# Patient Record
Sex: FEMALE | Race: OTHER | Employment: OTHER | URBAN - METROPOLITAN AREA
[De-identification: names, ages, dates, MRNs, and addresses within clinical notes are randomized per-mention and may not be internally consistent; named-entity substitution may affect disease eponyms.]

---

## 2019-01-15 NOTE — PERIOP NOTES
PAT - SURGICAL PRE-ADMISSION INSTRUCTIONS 
 
NAME:  Chaitanya Gamez                                                          TODAY'S DATE:  1/15/2019 SURGERY DATE:  1/17/2019                                  SURGERY ARRIVAL TIME:   TBA 1. Do NOT eat or drink anything, including candy or gum, after MIDNIGHT on 1/16/19 , unless you have specific instructions from your Surgeon or Anesthesia Provider to do so. 2. No smoking on the day of surgery. 3. No alcohol 24 hours prior to the day of surgery. 4. No recreational drugs for one week prior to the day of surgery. 5. Leave all valuables, including money/purse, at home. 6. Remove all jewelry, nail polish, makeup (including mascara); no lotions, powders, deodorant, or perfume/cologne/after shave. 7. Glasses/Contact lenses and Dentures may be worn to the hospital.  They will be removed prior to surgery. 8. Call your doctor if symptoms of a cold or illness develop within 24 ours prior to surgery. 9. AN ADULT MUST DRIVE YOU HOME AFTER OUTPATIENT SURGERY. 10. If you are having an OUTPATIENT procedure, please make arrangements for a responsible adult to be with you for 24 hours after your surgery. 11. If you are admitted to the hospital, you will be assigned to a bed after surgery is complete. Normally a family member will not be able to see you until you are in your assigned bed. 15. Family is encouraged to accompany you to the hospital.  We ask visitors in the treatment area to be limited to ONE person at a time to ensure patient privacy. EXCEPTIONS WILL BE MADE AS NEEDED. 15. Children under 12 are discouraged from entering the treatment area and need to be supervised by an adult when in the waiting room. Special Instructions: 
 
NONE. Patient Prep: 
 
shower with anti-bacterial soap These surgical instructions were reviewed with PATIENT during the PAT PHONE CALL. Directions:   On the morning of surgery, please go to the [facility]. Enter the building from the [facility] entrance, 1st floor (next to the Emergency Room entrance). Take the elevator to the 2nd floor. Sign in at the Registration Desk. If you have any questions and/or concerns, please do not hesitate to call: 
(Prior to the day of surgery)  [facility] unit:  [phone] (Day of surgery)  [facility] unit:  [phone]

## 2019-01-16 ENCOUNTER — ANESTHESIA EVENT (OUTPATIENT)
Dept: SURGERY | Age: 59
End: 2019-01-16
Payer: COMMERCIAL

## 2019-01-17 ENCOUNTER — HOSPITAL ENCOUNTER (OUTPATIENT)
Age: 59
Setting detail: OUTPATIENT SURGERY
Discharge: HOME | End: 2019-01-17
Attending: PLASTIC SURGERY | Admitting: PLASTIC SURGERY
Payer: COMMERCIAL

## 2019-01-17 ENCOUNTER — ANESTHESIA (OUTPATIENT)
Dept: SURGERY | Age: 59
End: 2019-01-17
Payer: COMMERCIAL

## 2019-01-17 VITALS
RESPIRATION RATE: 13 BRPM | WEIGHT: 206.5 LBS | HEIGHT: 66 IN | BODY MASS INDEX: 33.19 KG/M2 | DIASTOLIC BLOOD PRESSURE: 75 MMHG | TEMPERATURE: 98.4 F | OXYGEN SATURATION: 100 % | SYSTOLIC BLOOD PRESSURE: 106 MMHG | HEART RATE: 74 BPM

## 2019-01-17 DIAGNOSIS — S01.80XA OPEN FOREHEAD WOUND, INITIAL ENCOUNTER: Primary | ICD-10-CM

## 2019-01-17 PROCEDURE — 74011250636 HC RX REV CODE- 250/636

## 2019-01-17 PROCEDURE — 74011250636 HC RX REV CODE- 250/636: Performed by: PLASTIC SURGERY

## 2019-01-17 PROCEDURE — 74011250636 HC RX REV CODE- 250/636: Performed by: NURSE ANESTHETIST, CERTIFIED REGISTERED

## 2019-01-17 PROCEDURE — 74011000250 HC RX REV CODE- 250: Performed by: PLASTIC SURGERY

## 2019-01-17 PROCEDURE — 76010000149 HC OR TIME 1 TO 1.5 HR: Performed by: PLASTIC SURGERY

## 2019-01-17 PROCEDURE — 74011000250 HC RX REV CODE- 250

## 2019-01-17 PROCEDURE — 74011250637 HC RX REV CODE- 250/637: Performed by: NURSE ANESTHETIST, CERTIFIED REGISTERED

## 2019-01-17 PROCEDURE — 77030018846 HC SOL IRR STRL H20 ICUM -A: Performed by: PLASTIC SURGERY

## 2019-01-17 PROCEDURE — 77030018836 HC SOL IRR NACL ICUM -A: Performed by: PLASTIC SURGERY

## 2019-01-17 PROCEDURE — 77030011265 HC ELECTRD BLD HEX COVD -A: Performed by: PLASTIC SURGERY

## 2019-01-17 PROCEDURE — 76210000021 HC REC RM PH II 0.5 TO 1 HR: Performed by: PLASTIC SURGERY

## 2019-01-17 PROCEDURE — 77030002974 HC SUT PLN J&J -A: Performed by: PLASTIC SURGERY

## 2019-01-17 PROCEDURE — 77030013629 HC ELECTRD NDL STRY -B: Performed by: PLASTIC SURGERY

## 2019-01-17 PROCEDURE — 76060000033 HC ANESTHESIA 1 TO 1.5 HR: Performed by: PLASTIC SURGERY

## 2019-01-17 RX ORDER — SODIUM CHLORIDE, SODIUM LACTATE, POTASSIUM CHLORIDE, CALCIUM CHLORIDE 600; 310; 30; 20 MG/100ML; MG/100ML; MG/100ML; MG/100ML
50 INJECTION, SOLUTION INTRAVENOUS CONTINUOUS
Status: DISCONTINUED | OUTPATIENT
Start: 2019-01-17 | End: 2019-01-17 | Stop reason: HOSPADM

## 2019-01-17 RX ORDER — MIDAZOLAM HYDROCHLORIDE 1 MG/ML
INJECTION, SOLUTION INTRAMUSCULAR; INTRAVENOUS AS NEEDED
Status: DISCONTINUED | OUTPATIENT
Start: 2019-01-17 | End: 2019-01-17 | Stop reason: HOSPADM

## 2019-01-17 RX ORDER — DIPHENHYDRAMINE HYDROCHLORIDE 50 MG/ML
25 INJECTION, SOLUTION INTRAMUSCULAR; INTRAVENOUS
Status: CANCELLED | OUTPATIENT
Start: 2019-01-17

## 2019-01-17 RX ORDER — FENTANYL CITRATE 50 UG/ML
INJECTION, SOLUTION INTRAMUSCULAR; INTRAVENOUS AS NEEDED
Status: DISCONTINUED | OUTPATIENT
Start: 2019-01-17 | End: 2019-01-17 | Stop reason: HOSPADM

## 2019-01-17 RX ORDER — PROPOFOL 10 MG/ML
INJECTION, EMULSION INTRAVENOUS
Status: DISCONTINUED | OUTPATIENT
Start: 2019-01-17 | End: 2019-01-17 | Stop reason: HOSPADM

## 2019-01-17 RX ORDER — SODIUM CHLORIDE 0.9 % (FLUSH) 0.9 %
5-40 SYRINGE (ML) INJECTION EVERY 8 HOURS
Status: DISCONTINUED | OUTPATIENT
Start: 2019-01-17 | End: 2019-01-17 | Stop reason: HOSPADM

## 2019-01-17 RX ORDER — CLINDAMYCIN PHOSPHATE 900 MG/50ML
900 INJECTION INTRAVENOUS ONCE
Status: COMPLETED | OUTPATIENT
Start: 2019-01-17 | End: 2019-01-17

## 2019-01-17 RX ORDER — ONDANSETRON 2 MG/ML
INJECTION INTRAMUSCULAR; INTRAVENOUS AS NEEDED
Status: DISCONTINUED | OUTPATIENT
Start: 2019-01-17 | End: 2019-01-17 | Stop reason: HOSPADM

## 2019-01-17 RX ORDER — SCOLOPAMINE TRANSDERMAL SYSTEM 1 MG/1
1 PATCH, EXTENDED RELEASE TRANSDERMAL ONCE
Status: DISCONTINUED | OUTPATIENT
Start: 2019-01-17 | End: 2019-01-17 | Stop reason: HOSPADM

## 2019-01-17 RX ORDER — HYDROCODONE BITARTRATE AND ACETAMINOPHEN 5; 325 MG/1; MG/1
1 TABLET ORAL
Qty: 5 TAB | Refills: 0 | Status: SHIPPED | OUTPATIENT
Start: 2019-01-17

## 2019-01-17 RX ORDER — FAMOTIDINE 20 MG/1
20 TABLET, FILM COATED ORAL ONCE
Status: DISCONTINUED | OUTPATIENT
Start: 2019-01-17 | End: 2019-01-17 | Stop reason: HOSPADM

## 2019-01-17 RX ORDER — SODIUM CHLORIDE, SODIUM LACTATE, POTASSIUM CHLORIDE, CALCIUM CHLORIDE 600; 310; 30; 20 MG/100ML; MG/100ML; MG/100ML; MG/100ML
75 INJECTION, SOLUTION INTRAVENOUS CONTINUOUS
Status: CANCELLED | OUTPATIENT
Start: 2019-01-17

## 2019-01-17 RX ORDER — EPHEDRINE SULFATE 50 MG/ML
INJECTION, SOLUTION INTRAVENOUS AS NEEDED
Status: DISCONTINUED | OUTPATIENT
Start: 2019-01-17 | End: 2019-01-17 | Stop reason: HOSPADM

## 2019-01-17 RX ORDER — BISMUTH SUBSALICYLATE 262 MG
1 TABLET,CHEWABLE ORAL DAILY
COMMUNITY

## 2019-01-17 RX ORDER — FENTANYL CITRATE 50 UG/ML
50 INJECTION, SOLUTION INTRAMUSCULAR; INTRAVENOUS AS NEEDED
Status: CANCELLED | OUTPATIENT
Start: 2019-01-17

## 2019-01-17 RX ORDER — HYDROCODONE BITARTRATE AND ACETAMINOPHEN 5; 325 MG/1; MG/1
1 TABLET ORAL ONCE
Status: CANCELLED | OUTPATIENT
Start: 2019-01-17 | End: 2019-01-17

## 2019-01-17 RX ORDER — SODIUM CHLORIDE 0.9 % (FLUSH) 0.9 %
5-40 SYRINGE (ML) INJECTION AS NEEDED
Status: DISCONTINUED | OUTPATIENT
Start: 2019-01-17 | End: 2019-01-17 | Stop reason: HOSPADM

## 2019-01-17 RX ORDER — LIDOCAINE HYDROCHLORIDE 20 MG/ML
INJECTION, SOLUTION EPIDURAL; INFILTRATION; INTRACAUDAL; PERINEURAL AS NEEDED
Status: DISCONTINUED | OUTPATIENT
Start: 2019-01-17 | End: 2019-01-17 | Stop reason: HOSPADM

## 2019-01-17 RX ADMIN — SODIUM CHLORIDE, SODIUM LACTATE, POTASSIUM CHLORIDE, AND CALCIUM CHLORIDE 50 ML/HR: 600; 310; 30; 20 INJECTION, SOLUTION INTRAVENOUS at 10:57

## 2019-01-17 RX ADMIN — LIDOCAINE HYDROCHLORIDE 100 MG: 20 INJECTION, SOLUTION EPIDURAL; INFILTRATION; INTRACAUDAL; PERINEURAL at 12:05

## 2019-01-17 RX ADMIN — ONDANSETRON 4 MG: 2 INJECTION INTRAMUSCULAR; INTRAVENOUS at 12:10

## 2019-01-17 RX ADMIN — MIDAZOLAM HYDROCHLORIDE 2 MG: 1 INJECTION, SOLUTION INTRAMUSCULAR; INTRAVENOUS at 11:56

## 2019-01-17 RX ADMIN — EPHEDRINE SULFATE 5 MG: 50 INJECTION, SOLUTION INTRAVENOUS at 12:38

## 2019-01-17 RX ADMIN — PROPOFOL 160 MCG/KG/MIN: 10 INJECTION, EMULSION INTRAVENOUS at 12:05

## 2019-01-17 RX ADMIN — FENTANYL CITRATE 50 MCG: 50 INJECTION, SOLUTION INTRAMUSCULAR; INTRAVENOUS at 12:20

## 2019-01-17 RX ADMIN — CLINDAMYCIN PHOSPHATE 900 MG: 900 INJECTION INTRAVENOUS at 12:01

## 2019-01-17 NOTE — ANESTHESIA POSTPROCEDURE EVALUATION
Procedure(s): 
adjacent tissue transfer forehead,left check. Anesthesia Post Evaluation Multimodal analgesia: multimodal analgesia used between 6 hours prior to anesthesia start to PACU discharge Patient location during evaluation: bedside Patient participation: complete - patient participated Level of consciousness: awake Pain score: 0 Pain management: adequate Airway patency: patent Anesthetic complications: no 
Cardiovascular status: stable Respiratory status: acceptable Hydration status: acceptable Post anesthesia nausea and vomiting:  controlled Visit Vitals /75 Pulse 74 Temp 36.9 °C (98.4 °F) Resp 13 Ht 5' 6\" (1.676 m) Wt 93.7 kg (206 lb 8 oz) SpO2 100% BMI 33.33 kg/m²

## 2019-01-17 NOTE — ANESTHESIA PREPROCEDURE EVALUATION
Anesthetic History PONV Review of Systems / Medical History Patient summary reviewed and pertinent labs reviewed Pulmonary Within defined limits Neuro/Psych Within defined limits Cardiovascular Within defined limits Exercise tolerance: >4 METS 
  
GI/Hepatic/Renal 
Within defined limits Endo/Other Within defined limits Other Findings Physical Exam 
 
Airway Mallampati: I 
TM Distance: 4 - 6 cm Neck ROM: normal range of motion Mouth opening: Normal 
 
 Cardiovascular Regular rate and rhythm,  S1 and S2 normal,  no murmur, click, rub, or gallop Rhythm: regular Rate: normal 
 
 
 
 Dental 
 
Dentition: Lower dentition intact and Upper dentition intact Pulmonary Breath sounds clear to auscultation Abdominal 
GI exam deferred Other Findings Anesthetic Plan ASA: 1 Anesthesia type: MAC Induction: Intravenous Anesthetic plan and risks discussed with: Patient and Spouse

## 2019-01-17 NOTE — INTERVAL H&P NOTE
Plastic and Reconstructive Surgery H&P Update The patient was seen and examined in the pre-operative area. There are no changes to the H&P as documented in the most recent clinic visit. She successfully underwent Mohs resection of BCC of the forehead and left cheek this morning. The risks, benefits, alternatives and potential complications of the procedure(s) were again discussed in detail with the patient, including but not limited to pain, infection, bleeding, dehiscence, incomplete healing, asymmetry, damage to surrounding structures including nerves/vessels/bones/tendons/muscles/skin, paralysis, malunion/nonunion, need for additional treatment including surgery, recurrence, incomplete correction, poor cosmesis and poor scarring. Potential implant problems including exposure, infection, extrusion, rupture, capsular contracture, ALCL, rotation and malposition were also discussed if applicable. The patient had an opportunity to ask all questions and to have all concerns addressed. Written consent was obtained and confirmed. The patient was marked. To the OR today for forehead and left cheek reconstruction. Slava Mathew M.D. 
1/17/2019 
11:31 AM

## 2019-01-17 NOTE — DISCHARGE INSTRUCTIONS
Plastic Surgery (Dr. Bryon Bhardwaj) Discharge Instructions      -apply bacitracin or a similar antibiotic ointment to the incisions twice daily  -OK to gently shower/wet the affected area after 48 hours - wash with soap and water and pat dry gently  -do not submerge any incisions under water (e.g., bath, swimming pool, etc.) for at least two weeks  -keep head elevated (e.g., on multiple pillows when sleeping) in order to minimize swelling  -no strenuous activity or lifting greater than 10 pounds (i.e., no activity that may increase blood pressure) for at least two weeks  -make a follow-up appointment ([phone]) to be seen by Dr. Bryon Bhardwaj in about 1 week  -take pain medication only as prescribed and only if needed  -call the office or return to an ER immediately in case of uncontrollable pain or bleeding, persistent fever greater than 101.5 F, uncontrollable drainage from the wound or in case of any other questions/concerns      DISCHARGE SUMMARY from Nurse    PATIENT INSTRUCTIONS:    After general anesthesia or intravenous sedation, for 24 hours or while taking prescription Narcotics:  · Limit your activities  · Do not drive and operate hazardous machinery  · Do not make important personal or business decisions  · Do  not drink alcoholic beverages  · If you have not urinated within 8 hours after discharge, please contact your surgeon on call.     Report the following to your surgeon:  · Excessive pain, swelling, redness or odor of or around the surgical area  · Temperature over 100.5  · Nausea and vomiting lasting longer than 4 hours or if unable to take medications  · Any signs of decreased circulation or nerve impairment to extremity: change in color, persistent  numbness, tingling, coldness or increase pain  · Any questions    What to do at Home:      These are general instructions for a healthy lifestyle:    No smoking/ No tobacco products/ Avoid exposure to second hand smoke  Surgeon General's Warning:  Quitting smoking now greatly reduces serious risk to your health. Obesity, smoking, and sedentary lifestyle greatly increases your risk for illness    A healthy diet, regular physical exercise & weight monitoring are important for maintaining a healthy lifestyle    You may be retaining fluid if you have a history of heart failure or if you experience any of the following symptoms:  Weight gain of 3 pounds or more overnight or 5 pounds in a week, increased swelling in our hands or feet or shortness of breath while lying flat in bed. Please call your doctor as soon as you notice any of these symptoms; do not wait until your next office visit. Recognize signs and symptoms of STROKE:    F-face looks uneven    A-arms unable to move or move unevenly    S-speech slurred or non-existent    T-time-call 911 as soon as signs and symptoms begin-DO NOT go       Back to bed or wait to see if you get better-TIME IS BRAIN. Warning Signs of HEART ATTACK     Call 911 if you have these symptoms:   Chest discomfort. Most heart attacks involve discomfort in the center of the chest that lasts more than a few minutes, or that goes away and comes back. It can feel like uncomfortable pressure, squeezing, fullness, or pain.  Discomfort in other areas of the upper body. Symptoms can include pain or discomfort in one or both arms, the back, neck, jaw, or stomach.  Shortness of breath with or without chest discomfort.  Other signs may include breaking out in a cold sweat, nausea, or lightheadedness. Don't wait more than five minutes to call 911 - MINUTES MATTER! Fast action can save your life. Calling 911 is almost always the fastest way to get lifesaving treatment. Emergency Medical Services staff can begin treatment when they arrive -- up to an hour sooner than if someone gets to the hospital by car. The discharge information has been reviewed with the patient. The patient verbalized understanding.   Discharge medications reviewed with the patient and appropriate educational materials and side effects teaching were provided. ___________________________________________________________________________________________________________________________________  Patient armband removed and given to patient to take home.   Patient was informed of the privacy risks if armband lost or stolen

## 2019-01-17 NOTE — OP NOTES
Plastic and Reconstructive Surgery  Operation/Procedure Note      Patient Name: Chris Bhardwaj  Patient : 1960  Patient Sex: female  Patient Number: [id number]    Date of Operation: 2019  Place of Operation: [facility]    Pre-Operative Diagnosis: central forehead wound, 1 cm x 1 cm; left cheek wound, 1 cm x 1 cm  Post-Operative Diagnosis: same  Procedure(s): complex repair of forehead, 3.5 cm; complex repair of left cheek, 3.5 cm    Surgeon: Rudi Read M.D. Co-Surgeon(s)/Assistant(s): N/A  Anesthesiologist(s): Bianka Forrester MD  Anesthesia: MAC; 1:1 mixture of 1% lidocaine with 1:100,000 epinephrine and 0.25% marcaine plain (8 cc)    Findings: left cheek and central forehead Mohs defects reconstructed via complex repairs    Indications/Risks/Consent: The patient is a 60yF s/p Mohs resection of forehead and left cheek BCC. The options for management as well as the risks/benefits/alternatives/potential complications of each were discussed in detail with the patient, including but not limited to pain, infection, bleeding, dehiscence, need for additional treatment including surgery, recurrence, incomplete correction, damage to surrounding structures including bones/vessels/nerves/organs/tendons/muscles, poor cosmesis and poor scarring. After this extensive and detailed discussion, the patient expressed understanding of and agreement with the plan for the proposed procedure(s). Written consent was obtained. Description of the Procedure: The patient was taken to the treatment area and placed on the procedure table in a supine position. Sequential compression devices were placed on bilateral lower extremities and all pressure points were appropriately padded. The surgical site was prepped and draped in the standard sterile fashion. A surgical time-out was performed with all team members present and attentive. Clindamycin was administered for surgical prophylaxis.     Attention was first turned to the forehead defect. Bilateral lateral extensions into an ellipse were designed to allow for closure within an existing skin crease. A similar ellipse was designed around the left cheek wound parallel to the nasolabial crease. Local anesthetic was infiltrated into both wounds. The planned excisions were performed. Subcutaneous undermining was performed for both wounds to allow for advancement of skin and tension-free repair. Closure proceeded using 5-0 monocryl and 6-0 fast gut. Bacitracin was applied to each incision (3.5 cm each). The patient tolerated the procedure well. Complications: none    EBL: 5 cc  UOP: none    Implants: none  Drains/Packs/Catheters: none    Specimens for Microbiology: none  Specimens for Pathology: none    Duration of Procedure: 35 minutes    Counts: All sponge, needle and instrument counts were correct at the end of the procedure. Disposition/Instructions: d/c home; RTC in about 1 week(s); comprehensive warnings were given regarding when to call the MD and/or return to the ED      Slava Ocampo M.D.  1/17/2019  1:03 PM

## 2019-01-17 NOTE — PERIOP NOTES
Phase 2 Recovery Summary Patient arrived to Phase 2 1301 Report received from [facility], [facility] Vitals:  
 01/15/19 1259 01/17/19 1032 01/17/19 1049 01/17/19 1301 BP:   137/77 106/75 Pulse:   65 74 Resp:   18 13 Temp:   98.4 °F (36.9 °C) SpO2:   99% 100% Weight: 92.5 kg (204 lb) 93.7 kg (206 lb 8 oz) Height: 5' 6\" (1.676 m) 5' 6\" (1.676 m)    
 
 
oriented to time, place, person and situation Lines and Drains Peripheral Intravenous Line:   
 
Wound Wound Face (Active) DRESSING STATUS Clean, dry, and intact 1/17/2019  1:19 PM  
DRESSING TYPE Other (Comment) 1/17/2019  1:19 PM  
Number of days: 0 Patient discharged to home with family Ritesh Gibbs RN

## 2020-11-05 ENCOUNTER — IMPORTED ENCOUNTER (OUTPATIENT)
Dept: URBAN - METROPOLITAN AREA CLINIC 1 | Facility: CLINIC | Age: 60
End: 2020-11-05

## 2020-11-05 PROBLEM — H02.831: Noted: 2020-11-05

## 2020-11-05 PROBLEM — H04.123: Noted: 2020-11-05

## 2020-11-05 PROBLEM — H02.834: Noted: 2020-11-05

## 2020-11-05 PROBLEM — H25.813: Noted: 2020-11-05

## 2020-11-05 PROBLEM — H00.11: Noted: 2020-11-05

## 2020-11-05 PROBLEM — H00.011: Noted: 2020-11-05

## 2020-11-05 PROBLEM — H16.143: Noted: 2020-11-05

## 2020-11-05 PROCEDURE — 92002 INTRM OPH EXAM NEW PATIENT: CPT

## 2020-11-05 NOTE — PATIENT DISCUSSION
1.  Chalazion RUL -- Begin Tobradex TID (erx'd). Hot compresses TID x 5 minutes for 3 weeks. If without improvement discussed with patient possible Incision and Drainage procedure. Risks and benefits discussed with patient and patient states full understanding. 2. Hordeolum RUL 3. Cataract OU -- Observe for now without intervention. The patient was advised to contact us if any change or worsening of vision4. Dermatochalasis OU UL's  -- Follow with no intervention at this time. Return for an appointment in 2-3 weeks for a 30 (no MRX) with Dr. Mj Haddad.

## 2022-02-22 ENCOUNTER — EMERGENCY VISIT (OUTPATIENT)
Dept: URBAN - METROPOLITAN AREA CLINIC 1 | Facility: CLINIC | Age: 62
End: 2022-02-22

## 2022-02-22 DIAGNOSIS — H57.13: ICD-10-CM

## 2022-02-22 PROCEDURE — 92012 INTRM OPH EXAM EST PATIENT: CPT

## 2022-02-22 ASSESSMENT — VISUAL ACUITY
OS_SC: 20/20
OD_SC: 20/20

## 2022-02-22 ASSESSMENT — TONOMETRY
OD_IOP_MMHG: 17
OS_IOP_MMHG: 17

## 2022-02-22 NOTE — PATIENT DISCUSSION
No issues with eyes today, Negative Papilledema OU. Advised patient to follow up with her ENT/Neurologist to rule out any sinus/neurological issues. Consider sinus imaging/Neurological exam. D/c Toberdex and recommended any OTC sinus medication.

## 2022-04-02 ASSESSMENT — TONOMETRY
OD_IOP_MMHG: 16
OS_IOP_MMHG: 16

## 2022-04-02 ASSESSMENT — VISUAL ACUITY
OD_CC: 20/20
OS_CC: 20/20

## 2024-11-01 ENCOUNTER — COMPREHENSIVE EXAM (OUTPATIENT)
Dept: URBAN - METROPOLITAN AREA CLINIC 1 | Facility: CLINIC | Age: 64
End: 2024-11-01

## 2024-11-01 DIAGNOSIS — H02.821: ICD-10-CM

## 2024-11-01 DIAGNOSIS — H02.834: ICD-10-CM

## 2024-11-01 DIAGNOSIS — H43.812: ICD-10-CM

## 2024-11-01 DIAGNOSIS — H04.123: ICD-10-CM

## 2024-11-01 DIAGNOSIS — H02.831: ICD-10-CM

## 2024-11-01 DIAGNOSIS — H25.813: ICD-10-CM

## 2024-11-01 PROCEDURE — 92133 CPTRZD OPH DX IMG PST SGM ON: CPT

## 2024-11-01 PROCEDURE — 92014 COMPRE OPH EXAM EST PT 1/>: CPT

## 2025-06-24 ENCOUNTER — FOLLOW UP (OUTPATIENT)
Age: 65
End: 2025-06-24

## 2025-06-24 DIAGNOSIS — H04.123: ICD-10-CM

## 2025-06-24 PROCEDURE — 92012 INTRM OPH EXAM EST PATIENT: CPT

## 2025-08-15 ENCOUNTER — FOLLOW UP (OUTPATIENT)
Age: 65
End: 2025-08-15

## 2025-08-15 DIAGNOSIS — B88.0: ICD-10-CM

## 2025-08-15 DIAGNOSIS — H00.12: ICD-10-CM

## 2025-08-15 PROCEDURE — 92012 INTRM OPH EXAM EST PATIENT: CPT

## 2025-08-21 ENCOUNTER — CLINIC PROCEDURE ONLY (OUTPATIENT)
Age: 65
End: 2025-08-21

## 2025-08-21 DIAGNOSIS — H00.12: ICD-10-CM

## 2025-08-21 PROCEDURE — 67800 REMOVE EYELID LESION: CPT

## (undated) DEVICE — SMARTGOWN SURGICAL GOWN, 2XL: Brand: CONVERTORS

## (undated) DEVICE — (D)SUT PLN FST 6-0 18IN PC1 -- DISC BY MFR

## (undated) DEVICE — SOLUTION IRRIG 1000ML H2O STRL BLT

## (undated) DEVICE — TOWEL SURG W16XL26IN BLU NONFENESTRATED DLX ST 2 PER PK

## (undated) DEVICE — INTENDED FOR TISSUE SEPARATION, AND OTHER PROCEDURES THAT REQUIRE A SHARP SURGICAL BLADE TO PUNCTURE OR CUT.: Brand: BARD-PARKER ® CARBON RIB-BACK BLADES

## (undated) DEVICE — TRAY PREP DRY W/ PREM GLV 2 APPL 6 SPNG 2 UNDPD 1 OVERWRAP

## (undated) DEVICE — 8FR FRAZIER SUCTION HANDLE: Brand: CARDINAL HEALTH

## (undated) DEVICE — MICRODISSECTION NEEDLE STRAIGHT SLEEVE: Brand: COLORADO

## (undated) DEVICE — SOLUTION IV 1000ML 0.9% SOD CHL

## (undated) DEVICE — HEX-LOCKING BLADE ELECTRODE: Brand: EDGE

## (undated) DEVICE — STERILE POLYISOPRENE POWDER-FREE SURGICAL GLOVES: Brand: PROTEXIS

## (undated) DEVICE — BIPOLAR FORCEPS CORD,BANANA LEADS: Brand: VALLEYLAB

## (undated) DEVICE — DEPAUL MAJOR HEAD AND NECK: Brand: MEDLINE INDUSTRIES, INC.

## (undated) DEVICE — STERILE LATEX POWDER-FREE SURGICAL GLOVESWITH NITRILE COATING: Brand: PROTEXIS

## (undated) DEVICE — NDL PRT INJ NSAF BLNT 18GX1.5 --